# Patient Record
Sex: FEMALE | Race: WHITE | NOT HISPANIC OR LATINO | Employment: OTHER | URBAN - METROPOLITAN AREA
[De-identification: names, ages, dates, MRNs, and addresses within clinical notes are randomized per-mention and may not be internally consistent; named-entity substitution may affect disease eponyms.]

---

## 2017-02-27 ENCOUNTER — OFFICE VISIT - HEALTHEAST (OUTPATIENT)
Dept: GERIATRICS | Facility: CLINIC | Age: 72
End: 2017-02-27

## 2017-02-27 ENCOUNTER — AMBULATORY - HEALTHEAST (OUTPATIENT)
Dept: ADMINISTRATIVE | Facility: CLINIC | Age: 72
End: 2017-02-27

## 2017-02-27 DIAGNOSIS — M15.9 GENERALIZED OA: ICD-10-CM

## 2017-02-27 DIAGNOSIS — R29.898 LEG WEAKNESS: ICD-10-CM

## 2017-02-27 DIAGNOSIS — E03.9 HYPOTHYROIDISM: ICD-10-CM
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
## 2017-02-27 DIAGNOSIS — M81.0 OSTEOPOROSIS: ICD-10-CM

## 2017-02-27 DIAGNOSIS — I10 ESSENTIAL HYPERTENSION: ICD-10-CM

## 2017-02-27 RX ORDER — ROSUVASTATIN CALCIUM 40 MG/1
40 TABLET, COATED ORAL AT BEDTIME
Status: SHIPPED | COMMUNITY
Start: 2017-02-27

## 2017-02-27 RX ORDER — LEVOTHYROXINE SODIUM 112 UG/1
112 TABLET ORAL DAILY
Status: SHIPPED | COMMUNITY
Start: 2017-02-27

## 2017-02-27 RX ORDER — LOSARTAN POTASSIUM 100 MG/1
50 TABLET ORAL DAILY
Status: SHIPPED | COMMUNITY
Start: 2017-02-27

## 2017-02-27 RX ORDER — CALCIUM CARBONATE 500(1250)
1 TABLET ORAL DAILY
Status: SHIPPED | COMMUNITY
Start: 2017-02-27

## 2017-02-27 RX ORDER — TRAMADOL HYDROCHLORIDE 50 MG/1
50 TABLET ORAL EVERY 6 HOURS PRN
Status: SHIPPED | COMMUNITY
Start: 2017-02-27

## 2017-03-02 ENCOUNTER — OFFICE VISIT - HEALTHEAST (OUTPATIENT)
Dept: GERIATRICS | Facility: CLINIC | Age: 72
End: 2017-03-02

## 2017-03-02 DIAGNOSIS — E03.9 HYPOTHYROIDISM: ICD-10-CM

## 2017-03-02 DIAGNOSIS — T07.XXXA MULTIPLE WOUNDS: ICD-10-CM

## 2017-03-02 DIAGNOSIS — F32.A DEPRESSION: ICD-10-CM

## 2017-03-02 DIAGNOSIS — I10 ESSENTIAL HYPERTENSION: ICD-10-CM

## 2017-03-02 DIAGNOSIS — R53.1 WEAKNESS: ICD-10-CM

## 2017-03-06 ENCOUNTER — OFFICE VISIT - HEALTHEAST (OUTPATIENT)
Dept: GERIATRICS | Facility: CLINIC | Age: 72
End: 2017-03-06

## 2017-03-06 DIAGNOSIS — M15.9 GENERALIZED OA: ICD-10-CM

## 2017-03-06 DIAGNOSIS — R52 PAIN MANAGEMENT: ICD-10-CM

## 2017-03-06 DIAGNOSIS — R29.898 LEG WEAKNESS: ICD-10-CM

## 2017-03-06 DIAGNOSIS — I10 ESSENTIAL HYPERTENSION: ICD-10-CM

## 2017-03-09 ENCOUNTER — OFFICE VISIT - HEALTHEAST (OUTPATIENT)
Dept: GERIATRICS | Facility: CLINIC | Age: 72
End: 2017-03-09

## 2017-03-09 DIAGNOSIS — R26.81 GAIT INSTABILITY: ICD-10-CM

## 2017-03-09 DIAGNOSIS — I10 ESSENTIAL HYPERTENSION: ICD-10-CM

## 2017-03-09 DIAGNOSIS — R29.898 LEG WEAKNESS: ICD-10-CM
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
## 2017-03-13 ENCOUNTER — AMBULATORY - HEALTHEAST (OUTPATIENT)
Dept: GERIATRICS | Facility: CLINIC | Age: 72
End: 2017-03-13

## 2021-05-25 ENCOUNTER — RECORDS - HEALTHEAST (OUTPATIENT)
Dept: ADMINISTRATIVE | Facility: CLINIC | Age: 76
End: 2021-05-25

## 2021-06-09 NOTE — PROGRESS NOTES
[facility]    Facility:   [facility] [[id number]]   Code Status: FULL CODE      CHIEF COMPLAINT/REASON FOR VISIT:  Chief Complaint   Patient presents with     Follow Up     lucina, rehab       HISTORY:      HPI: Vanesa is a 71 y.o. female Who I had the pleasure of visiting with once again today secondary to her hospitalization February 21 secondary to a fall and was found down hypothermic and unresponsive. Currently is on the transitional care unit and she's made pretty good progress. Denies muscle aches or pains a lease from the rhabdomyolysis. She does have chronic pain osteoarthritis she does take tramadol as needed taking about three doses per day. She has been normotensive and afebrile and also on room air. Sometimes use a walker and sometimes not. Apparently having a care conference soon she believes she's ready to go home. Appetite good no particular other complaints.    Past Medical History:   Diagnosis Date     Acute respiratory failure      Diverticulitis      HTN (hypertension)      Hypercholesteremia      Hypothermia 02/2017     Hypothyroidism      Microscopic colitis      Osteoarthritis      Osteoporosis      Traumatic rhabdomyolysis      Tubular adenoma      Urge incontinence      Vitamin D deficiency              Family History   Problem Relation Age of Onset     Cancer Mother      Stroke Father      Leukemia Sister      Coronary artery disease Brother      Social History     Social History     Marital status:      Spouse name: N/A     Number of children: N/A     Years of education: N/A     Social History Main Topics     Smoking status: Never Smoker     Smokeless tobacco: Not on file     Alcohol use No     Drug use: Not on file     Sexual activity: Not on file     Other Topics Concern     Not on file     Social History Narrative     No narrative on file         Review of Systems  Currently she denies any chills or fever coughing wheezing chest  pain dizziness or vertigo nausea vomiting diarrhea dysuria rashes's or stiff neck swollen glands are headaches. History of hypertension. Hypothyroidism      Current Outpatient Prescriptions:      aspirin 81 MG EC tablet, Take 81 mg by mouth daily., Disp: , Rfl:      calcium, as carbonate, (OS-CAROL) 500 mg calcium (1,250 mg) tablet, Take 1 tablet by mouth daily., Disp: , Rfl:      levothyroxine (SYNTHROID, LEVOTHROID) 112 MCG tablet, Take 112 mcg by mouth daily., Disp: , Rfl:      losartan (COZAAR) 100 MG tablet, Take 50 mg by mouth daily., Disp: , Rfl:      rosuvastatin (CRESTOR) 40 MG tablet, Take 40 mg by mouth bedtime., Disp: , Rfl:      sertraline (ZOLOFT) 50 MG tablet, Take 758 mg by mouth daily., Disp: , Rfl:      traMADol (ULTRAM) 50 mg tablet, Take 50 mg by mouth every 6 (six) hours as needed for pain., Disp: , Rfl:     .  Vitals:    03/06/17 0956   BP: 138/48   Pulse: 85   Resp: 16   Temp: 98.3  F (36.8  C)   SpO2: 90%       Physical Exam   Constitutional: She is oriented to person, place, and time. She appears well-developed and well-nourished. No distress.   HENT:   Head: Normocephalic.   Eyes: Pupils are equal, round, and reactive to light.   Neck: Neck supple. No thyromegaly present.   Cardiovascular: Normal rate, regular rhythm and normal heart sounds.   Pulmonary/Chest: Breath sounds normal.   Abdominal: Bowel sounds are normal. There is no tenderness. There is no guarding.   Musculoskeletal:   Able to use the two wheeled walker. Denies pain. Does have tramadol PRN.   Lymphadenopathy:   She has no cervical adenopathy.   Neurological: She is oriented to person, place, and time.   Skin: Skin is warm and dry. No rash noted.   Abrasions to her elbows and knees. Dry skin to her back.   Psychiatric: She has a normal mood and affect. Her behavior is normal.    LABS:   Results for orders placed or performed in visit on 03/01/17   Basic Metabolic Panel   Result Value Ref Range    Sodium 140 136 - 145 mmol/L     Potassium 3.9 3.5 - 5.0 mmol/L    Chloride 107 98 - 107 mmol/L    CO2 26 22 - 31 mmol/L    Anion Gap, Calculation 7 5 - 18 mmol/L    Glucose 86 70 - 125 mg/dL    Calcium 8.9 8.5 - 10.5 mg/dL    BUN 6 (L) 8 - 28 mg/dL    Creatinine 0.53 (L) 0.60 - 1.10 mg/dL    GFR MDRD Af Amer >60 >60 mL/min/1.73m2    GFR MDRD Non Af Amer >60 >60 mL/min/1.73m2       Lab Results   Component Value Date    WBC 5.9 03/01/2017    HGB 11.1 (L) 03/01/2017    HCT 35.4 03/01/2017    MCV 99 03/01/2017     03/01/2017         ASSESSMENT:      ICD-10-CM    1. Essential hypertension I10    2. Leg weakness M62.81    3. Generalized OA M15.9    4. Pain management R52        PLAN:    At this time no further changes are necessary. Did have a number of laboratory studies March 1. She wants to go home soon. Apparently is on the list soon for a care conference and she's hoping be discharged by the end of the week    Electronically signed by: Michael Duane Johnson, CNP

## 2021-06-09 NOTE — PROGRESS NOTES
[facility]      Facility:    [facility] [[id number]]  Code Status: FULL CODE       Chief Complaint/Reason for Visit:  Chief Complaint   Patient presents with     H & P     New admit to TCU: multiple concerns. (H & P 3/2/17).       HPI:   Vanesa is a 71 y.o. female who was recently hospitalized at [facility] for hypothermia, rhabdomyolysis, frostbite, sepsis. She lives near [place], [place] and was found down on her porch on 2/21/17 with initial reports stating she had last been seen 36-48 hours prior. Patient reports she was on her porch to let her dog in, fell and must have hit her head and blacked out. She recalls however trying to crawl to get inside. Her daughter found her, called EMS. She was air lifted to [facility] where she was noted to be hypothermic down to 78 degrees and lethargic. In the ED she was intubated for airway protection. Tox screen was negative. Head CT showed no acute intracranial injury. Cervical spine CT showed no fracture. She was treated with warming, IVF's, pressor support and broad spectrum antibiotics. She was able to be extubated the next day. Cultures revealed E coli UTI. Other notable issues were presenting Hgb of 8.8. Also had high Na and low K on admit. Mild elevated liver enzymes with hx microscopic colitis. Significant wounds on elbows, knees, feet, and rhabdomyolysis. Medical managed and stabilized in the hospital, deemed appropriate for discharge to TCU on 2/25/17 for PT, OT, nursing cares, medical management and monitoring.     She is feeling weak but improved. Minimal pain in extremities and at wounds. She was told she likely has concussion. Working with speech therapy for cognitive exercises. She denies headache, nausea, vomiting, chest pain, shortness of breath or dizziness. Appetite is fair. No diarrhea or constipation. No abdominal pain. No new vision or hearing problems.       Past Medical History:  Past Medical  History:   Diagnosis Date     Acute respiratory failure      Diverticulitis      HTN (hypertension)      Hypercholesteremia      Hypothermia 02/2017     Hypothyroidism      Microscopic colitis      Osteoarthritis      Osteoporosis      Traumatic rhabdomyolysis      Tubular adenoma      Urge incontinence      Vitamin D deficiency            Surgical History:  Past Surgical History:   Procedure Laterality Date     APPENDECTOMY       CHOLECYSTECTOMY       HEMICOLECTOMY  2005    for diverticular mass     TOTAL KNEE ARTHROPLASTY         Family History:   Family History   Problem Relation Age of Onset     Cancer Mother      Stroke Father      Leukemia Sister      Coronary artery disease Brother        Social History:    Social History     Social History     Marital status:      Spouse name: N/A     Number of children: N/A     Years of education: N/A     Social History Main Topics     Smoking status: Never Smoker     Smokeless tobacco: Not on file     Alcohol use No     Drug use: Not on file     Sexual activity: Not on file     Other Topics Concern     Not on file     Social History Narrative     No narrative on file        Medications:  Current Outpatient Prescriptions   Medication Sig     aspirin 81 MG EC tablet Take 81 mg by mouth daily.     calcium, as carbonate, (OS-CAROL) 500 mg calcium (1,250 mg) tablet Take 1 tablet by mouth daily.     levothyroxine (SYNTHROID, LEVOTHROID) 112 MCG tablet Take 112 mcg by mouth daily.     losartan (COZAAR) 100 MG tablet Take 50 mg by mouth daily.     rosuvastatin (CRESTOR) 40 MG tablet Take 40 mg by mouth bedtime.     sertraline (ZOLOFT) 50 MG tablet Take 758 mg by mouth daily.     traMADol (ULTRAM) 50 mg tablet Take 50 mg by mouth every 6 (six) hours as needed for pain.       Allergies:  Allergies   Allergen Reactions     Adhesive Tape-Silicones      Atorvastatin      Lisinopril      Oxycodone      Penicillins        Review of Systems:  Pertinent items are noted in  HPI.      Physical Exam:   General: Patient is alert, pleasant female, no distress.   Vitals: /68, Temp 98.4, Pulse 86, RR 16, O2 sat 93% RA.  HEENT: Head is NCAT. Eyes show no injection or icterus. Nares negative. Oropharynx well hydrated.  Neck: Supple. No tenderness or adenopathy. No JVD.  Lungs: Clear bilaterally. No wheezes.  Cardiovascular: Regular rate and rhythm, normal S1, S2.  Back: No spinal or CVA tenderness.  Abdomen: Soft, no tenderness on exam. Bowel sounds present. No guarding rebound or rigidity.  : Deferred.  Extremities: Mild edema is noted.  Musculoskeletal: Age related degen changes.  Skin: Significant wounds and abrasions bilateral knees, bilateral elbows and bilateral feet/toes.   Psych: Mood appears good.      Labs:  Results for orders placed or performed in visit on 03/01/17   Basic Metabolic Panel   Result Value Ref Range    Sodium 140 136 - 145 mmol/L    Potassium 3.9 3.5 - 5.0 mmol/L    Chloride 107 98 - 107 mmol/L    CO2 26 22 - 31 mmol/L    Anion Gap, Calculation 7 5 - 18 mmol/L    Glucose 86 70 - 125 mg/dL    Calcium 8.9 8.5 - 10.5 mg/dL    BUN 6 (L) 8 - 28 mg/dL    Creatinine 0.53 (L) 0.60 - 1.10 mg/dL    GFR MDRD Af Amer >60 >60 mL/min/1.73m2    GFR MDRD Non Af Amer >60 >60 mL/min/1.73m2       Lab Results   Component Value Date    WBC 5.9 03/01/2017    HGB 11.1 (L) 03/01/2017    HCT 35.4 03/01/2017    MCV 99 03/01/2017     03/01/2017       Assessment/Plan:  1. Multiple wounds. Bilateral elbows, knees, feet. Frostbite and abrasions. Cont current cares.  2. Weakness. Significant medical illness and debilitation. Here for therapy for strengthening.  3. HTN. On Losartan.  4. Hypothyroidism. Cont home Levothyroxine.  5. Depression. Cont home Sertraline.  6. HLD. On Rosuvastatin.  7. Other hospital issues: hypothermia after being found down, rhabdo. UTI, elevated LFTs, sepsis, UTI (active hospital issues improved or resolved by the time of discharge).  8. Anemia.  9. Code  status is full code.      Total time greater than 70 minutes, greater than 50% counseling and coordination of care, time spent in interview and examination of patient, review of records, discussion with nursing staff.      Electronically signed by: Jing Larkin MD

## 2021-06-09 NOTE — PROGRESS NOTES
[facility]    Facility:   [facility] [[id number]]   Code Status: FULL CODE      CHIEF COMPLAINT/REASON FOR VISIT:  Chief Complaint   Patient presents with     Problem Visit     asked to see       HISTORY:      HPI: Vanesa is a 71 y.o. female Who I was asked to see secondary to recent hospitalization February 21, 2017 from an incident that occurred in [facility] who was flown from [facility] after being found down. She was found by her daughter laying on the porch and was found to be hypothermic temperature at 78  lethargic and unresponsive. When she reached the emergency department she was intubated for airway protection and stabilization. Her laboratory studies did show a CK 2588 white cells 10.7 hemoglobin 8.8 platelets 125 phosphors 3.4 ALT 23 AST 61 albumin 1.5 sodium 154 potassium 2.4 creatinine 0.23 BUN 17. EKG did show a bradycardia. CT of the head did not show any traumatic intracranial injury. CT of the cervical spine did not reveal any fractures. CT of the chest did show intact thoracic aorta. Dependent opacities in both lungs clonic diverticulosis. She also did have multiple wounds and injuries as well as rhabdomyolysis. They did check her thyroid blood sugar. They did use for leaders of saline in the emergency room and did check her electrolytes secondary to hypernatremia and hypokalemia.  Currently she is on the transitional care unit and actually pretty healthy at this time most family and friends are thinking that she shouldn't even be here and be able to go home but we had a chance to address that in the importance of retaining and obtaining her strength and stamina and get her balance and core strength back. She agrees with all those accounts. Also be good idea to repeater labs from the hospital just to make sure that everything is in good shape and there are no other particular issues. Otherwise her appetite is good she has been  normotensive afebrile also on room air. No particular pain she's taken to tramadol is PRN. She is finishing up her Cipro from not sure what.    Past Medical History:   Diagnosis Date     Acute respiratory failure      Diverticulitis      HTN (hypertension)      Hypercholesteremia      Hypothermia 02/2017     Hypothyroidism      Microscopic colitis      Osteoarthritis      Osteoporosis      Traumatic rhabdomyolysis      Tubular adenoma      Urge incontinence      Vitamin D deficiency              Family History   Problem Relation Age of Onset     Cancer Mother      Stroke Father      Leukemia Sister      Coronary artery disease Brother      Social History     Social History     Marital status:      Spouse name: N/A     Number of children: N/A     Years of education: N/A     Social History Main Topics     Smoking status: Never Smoker     Smokeless tobacco: Not on file     Alcohol use No     Drug use: Not on file     Sexual activity: Not on file     Other Topics Concern     Not on file     Social History Narrative     No narrative on file         Review of Systems  Currently she denies any chills or fever coughing wheezing chest pain dizziness or vertigo nausea vomiting diarrhea dysuria rashes's or stiff neck swollen glands are headaches. History of hypertension. Hypothyroidism    Current Outpatient Prescriptions:      aspirin 81 MG EC tablet, Take 81 mg by mouth daily., Disp: , Rfl:      calcium, as carbonate, (OS-CAROL) 500 mg calcium (1,250 mg) tablet, Take 1 tablet by mouth daily., Disp: , Rfl:      ciprofloxacin HCl (CIPRO) 500 MG tablet, Take 500 mg by mouth 2 (two) times a day., Disp: , Rfl:      levothyroxine (SYNTHROID, LEVOTHROID) 112 MCG tablet, Take 112 mcg by mouth daily., Disp: , Rfl:      losartan (COZAAR) 100 MG tablet, Take 50 mg by mouth daily., Disp: , Rfl:      rosuvastatin (CRESTOR) 40 MG tablet, Take 40 mg by mouth bedtime., Disp: , Rfl:      sertraline (ZOLOFT) 50 MG tablet, Take 758 mg by  mouth daily., Disp: , Rfl:      traMADol (ULTRAM) 50 mg tablet, Take 50 mg by mouth every 6 (six) hours as needed for pain., Disp: , Rfl:     .  Vitals:    02/27/17 1348   BP: 147/59   Pulse: 87   Resp: 17   Temp: 98.1  F (36.7  C)   SpO2: 98%       Physical Exam   Constitutional: She is oriented to person, place, and time. She appears well-developed and well-nourished. No distress.   HENT:   Head: Normocephalic.   Eyes: Pupils are equal, round, and reactive to light.   Neck: Neck supple. No thyromegaly present.   Cardiovascular: Normal rate, regular rhythm and normal heart sounds.    Pulmonary/Chest: Breath sounds normal.   Abdominal: Bowel sounds are normal. There is no tenderness. There is no guarding.   Musculoskeletal:   Able to use the two wheeled walker. Denies pain. Does have tramadol PRN.   Lymphadenopathy:     She has no cervical adenopathy.   Neurological: She is oriented to person, place, and time.   Skin: Skin is warm and dry. No rash noted.   Abrasions to her elbows and knees. Dry skin to her back.   Psychiatric: She has a normal mood and affect. Her behavior is normal.         LABS:   See above  Results for orders placed or performed during the hospital encounter of 12/01/10   Basic Metabolic Panel   Result Value Ref Range    Sodium 137 136 - 145 mmol/L    Potassium 4.4 3.5 - 5.0 mmol/L    CO2 25 25 - 31 mmol/L    Chloride 107 98 - 107 mmol/L    Anion Gap, Calculation 5 5 - 18 mmol/L    BUN 5 (L) 8 - 22 mg/dL    Creatinine 0.48 (L) 0.60 - 1.10 mg/dL    GFR MDRD Non Af Amer >60 >60 ml/min/1.73m2    GFR MDRD Af Amer >60 >60 ml/min/1.73m2    Glucose 115 70 - 125 mg/dL    Calcium 7.9 (L) 8.5 - 10.5 mg/dL           ASSESSMENT:      ICD-10-CM    1. Essential hypertension I10    2. Fall W19.XXXA    3. Leg weakness M62.81    4. Generalized OA M15.9    5. Osteoporosis M81.0    6. Hypothyroidism E03.9        PLAN:    Will give her some Aquaphor to her rash on her back seem to be more dry skin. Will put  bacitracin on her scabs in various wounds. We will repeat some of her labs is to make sure that sodium potassium hemoglobin her own pretty good shape in the next couple of days otherwise she will work with therapy and try to improve her strength balance and conditioning.          For documentation purposes total visit was over 40 minutes to which over 50% was spent with the patient going over her care her medications her hospitalization her stay on the transitional care unit and coordination of care efforts.        Electronically signed by: Michael Duane Johnson, CNP

## 2021-06-09 NOTE — PROGRESS NOTES
[facility]    Facility:   [facility] [[id number]]   Code Status: FULL CODE  PCP: Provider Not in System   Phone: None   Fax: [phone]      CHIEF COMPLAINT/REASON FOR VISIT:  Chief Complaint   Patient presents with     Discharge Summary       HISTORY COURSE:  Vanesa is a 71 y.o. female Who I was asked to see secondary to recent hospitalization February 21, 2017 from an incident that occurred in [facility] who was flown from [facility] after being found down. She was found by her daughter laying on the porch and was found to be hypothermic temperature at 78  lethargic and unresponsive. When she reached the emergency department she was intubated for airway protection and stabilization. Her laboratory studies did show a CK 2588 white cells 10.7 hemoglobin 8.8 platelets 125 phosphors 3.4 ALT 23 AST 61 albumin 1.5 sodium 154 potassium 2.4 creatinine 0.23 BUN 17. EKG did show a bradycardia. CT of the head did not show any traumatic intracranial injury. CT of the cervical spine did not reveal any fractures. CT of the chest did show intact thoracic aorta. Dependent opacities in both lungs clonic diverticulosis. She also did have multiple wounds and injuries as well as rhabdomyolysis. They did check her thyroid blood sugar. They did use for leaders of saline in the emergency room and did check her electrolytes secondary to hypernatremia and hypokalemia.  She has done remarkably well while being on the transitional care unit at [facility].  No unusual aches or pains although does have generalized osteoarthritis and does take tramadol is as needed usually anywhere from 1-3 doses per day.  She has been normotensive and afebrile.  Has been independent using her walker.  No respiratory issues.  He is on Tamiflu prophylaxis until March 16 secondary to a confirmed case on the unit but has otherwise been asymptomatic.  She is excited about going  home and does feel that she will do fine without any particular problems or other issues.  Review of Systems  Currently she denies any chills or fever coughing wheezing chest pain dizziness or vertigo nausea vomiting diarrhea dysuria rashes's or stiff neck swollen glands are headaches. History of hypertension. Hypothyroidism  Vitals:    03/09/17 1252   BP: (!) 154/93   Pulse: 78   Resp: 18   Temp: 99  F (37.2  C)   SpO2: 97%       Physical Exam  Constitutional: She is oriented to person, place, and time. She appears well-developed and well-nourished. No distress.   HENT:   Head: Normocephalic.   Eyes: Pupils are equal, round, and reactive to light.   Neck: Neck supple. No thyromegaly present.   Cardiovascular: Normal rate, regular rhythm and normal heart sounds.   Pulmonary/Chest: Breath sounds normal.   Abdominal: Bowel sounds are normal. There is no tenderness. There is no guarding.   Musculoskeletal:   Able to use the two wheeled walker. Denies pain. Does have tramadol PRN.   Lymphadenopathy:   She has no cervical adenopathy.   Neurological: She is oriented to person, place, and time.   Skin: Skin is warm and dry. No rash noted.   Abrasions to her elbows and knees. Dry skin to her back.   Psychiatric: She has a normal mood and affect. Her behavior is normal.     Results for orders placed or performed in visit on 03/01/17   Basic Metabolic Panel   Result Value Ref Range    Sodium 140 136 - 145 mmol/L    Potassium 3.9 3.5 - 5.0 mmol/L    Chloride 107 98 - 107 mmol/L    CO2 26 22 - 31 mmol/L    Anion Gap, Calculation 7 5 - 18 mmol/L    Glucose 86 70 - 125 mg/dL    Calcium 8.9 8.5 - 10.5 mg/dL    BUN 6 (L) 8 - 28 mg/dL    Creatinine 0.53 (L) 0.60 - 1.10 mg/dL    GFR MDRD Af Amer >60 >60 mL/min/1.73m2    GFR MDRD Non Af Amer >60 >60 mL/min/1.73m2       Lab Results   Component Value Date    WBC 5.9 03/01/2017    HGB 11.1 (L) 03/01/2017    HCT 35.4 03/01/2017    MCV 99 03/01/2017     03/01/2017       MEDICATION  LIST:  Current Outpatient Prescriptions   Medication Sig     aspirin 81 MG EC tablet Take 81 mg by mouth daily.     calcium, as carbonate, (OS-CAROL) 500 mg calcium (1,250 mg) tablet Take 1 tablet by mouth daily.     levothyroxine (SYNTHROID, LEVOTHROID) 112 MCG tablet Take 112 mcg by mouth daily.     losartan (COZAAR) 100 MG tablet Take 50 mg by mouth daily.     rosuvastatin (CRESTOR) 40 MG tablet Take 40 mg by mouth bedtime.     sertraline (ZOLOFT) 50 MG tablet Take 75 mg by mouth daily.      traMADol (ULTRAM) 50 mg tablet Take 50 mg by mouth every 6 (six) hours as needed for pain.       DISCHARGE DIAGNOSIS:    ICD-10-CM    1. Essential hypertension I10    2. Gait instability R26.81    3. Leg weakness M62.81    4. Fall W19.XXXA        MEDICAL EQUIPMENT NEEDS:  walker    DISCHARGE PLAN/FACE TO FACE:  I certify that this patient is under my care and that I, or a nurse practitioner or physician's assistant working with me, had a face-to-face encounter that meets the physician face-to-face encounter requirements with this patient.   Date of Face-to-Face Encounter: March 9, 2017    I certify that, based on my findings, the following services are medically necessary home health services: Discharging to home with current medications and narcotics.  No services     My clinical findings support the need for the above skilled services because: (Please write a brief narrative summary that describes what the RN, PT, SLP, or other services will be doing in the home. A list of diagnoses in this section does not meet the CMS requirements.)  No skilled services required.      This patient is homebound because: (Please write a brief narrative summary describing the functional limitations as to why this patient is homebound and specifically what makes this patient homebound.)  Will not be homebound.    The patient is, or has been, under my care and I have initiated the establishment of the plan of care. This patient will be followed  by a physician who will periodically review the plan of care.  She will follow-up with her primary care doctor regarding medication management and any future laboratory studies.  She does feel really good at this time does not have any particular questions.  I did again go over her hospitalization and the reason for hospitalization and she does understand and does feel comfortable upon going home and does have family support.    Discharge coordination of care greater than 30 minutes.    Electronically signed by: Michael Duane Johnson, CNP       Electronically signed by: Jing Larkin MD

## 2021-06-15 PROBLEM — E03.9 HYPOTHYROIDISM: Status: ACTIVE | Noted: 2017-02-27

## 2021-06-15 PROBLEM — M15.9 GENERALIZED OA: Status: ACTIVE | Noted: 2017-02-27

## 2021-06-15 PROBLEM — R29.898 LEG WEAKNESS: Status: ACTIVE | Noted: 2017-02-27
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
## 2021-06-15 PROBLEM — I10 HTN (HYPERTENSION): Status: ACTIVE | Noted: 2017-02-27

## 2021-06-15 PROBLEM — M81.0 OSTEOPOROSIS: Status: ACTIVE | Noted: 2017-02-27

## 2023-08-22 ENCOUNTER — HOSPITAL ENCOUNTER (EMERGENCY)
Facility: HOSPITAL | Age: 78
Discharge: HOME | End: 2023-08-22
Attending: EMERGENCY MEDICINE | Admitting: EMERGENCY MEDICINE
Payer: COMMERCIAL

## 2023-08-22 ENCOUNTER — APPOINTMENT (OUTPATIENT)
Dept: CT IMAGING | Facility: HOSPITAL | Age: 78
End: 2023-08-22
Attending: EMERGENCY MEDICINE
Payer: COMMERCIAL

## 2023-08-22 VITALS
DIASTOLIC BLOOD PRESSURE: 67 MMHG | SYSTOLIC BLOOD PRESSURE: 135 MMHG | WEIGHT: 191 LBS | OXYGEN SATURATION: 95 % | HEART RATE: 75 BPM | BODY MASS INDEX: 35.15 KG/M2 | TEMPERATURE: 98.1 F | HEIGHT: 62 IN | RESPIRATION RATE: 20 BRPM

## 2023-08-22 DIAGNOSIS — I10 ACCELERATED HYPERTENSION: ICD-10-CM

## 2023-08-22 LAB
ALBUMIN UR-MCNC: NEGATIVE MG/DL
ANION GAP SERPL CALCULATED.3IONS-SCNC: 11 MMOL/L (ref 7–15)
APPEARANCE UR: CLEAR
BILIRUB UR QL STRIP: NEGATIVE
BUN SERPL-MCNC: 11.3 MG/DL (ref 8–23)
CALCIUM SERPL-MCNC: 9.5 MG/DL (ref 8.8–10.2)
CHLORIDE SERPL-SCNC: 100 MMOL/L (ref 98–107)
COLOR UR AUTO: COLORLESS
CREAT SERPL-MCNC: 0.49 MG/DL (ref 0.51–0.95)
DEPRECATED HCO3 PLAS-SCNC: 25 MMOL/L (ref 22–29)
ERYTHROCYTE [DISTWIDTH] IN BLOOD BY AUTOMATED COUNT: 13 % (ref 10–15)
GFR SERPL CREATININE-BSD FRML MDRD: >90 ML/MIN/1.73M2
GLUCOSE SERPL-MCNC: 101 MG/DL (ref 70–99)
GLUCOSE UR STRIP-MCNC: NEGATIVE MG/DL
HCT VFR BLD AUTO: 40.5 % (ref 35–47)
HGB BLD-MCNC: 13.6 G/DL (ref 11.7–15.7)
HGB UR QL STRIP: NEGATIVE
KETONES UR STRIP-MCNC: NEGATIVE MG/DL
LEUKOCYTE ESTERASE UR QL STRIP: NEGATIVE
MAGNESIUM SERPL-MCNC: 1.8 MG/DL (ref 1.7–2.3)
MCH RBC QN AUTO: 30.6 PG (ref 26.5–33)
MCHC RBC AUTO-ENTMCNC: 33.6 G/DL (ref 31.5–36.5)
MCV RBC AUTO: 91 FL (ref 78–100)
NITRATE UR QL: NEGATIVE
PH UR STRIP: 5.5 [PH] (ref 5–7)
PLATELET # BLD AUTO: 259 10E3/UL (ref 150–450)
POTASSIUM SERPL-SCNC: 4 MMOL/L (ref 3.4–5.3)
RBC # BLD AUTO: 4.44 10E6/UL (ref 3.8–5.2)
RBC URINE: <1 /HPF
SODIUM SERPL-SCNC: 136 MMOL/L (ref 136–145)
SP GR UR STRIP: 1.01 (ref 1–1.03)
SQUAMOUS EPITHELIAL: 3 /HPF
TSH SERPL DL<=0.005 MIU/L-ACNC: 2.55 UIU/ML (ref 0.3–4.2)
UROBILINOGEN UR STRIP-MCNC: <2 MG/DL
WBC # BLD AUTO: 6.6 10E3/UL (ref 4–11)
WBC URINE: 1 /HPF

## 2023-08-22 PROCEDURE — 80048 BASIC METABOLIC PNL TOTAL CA: CPT | Performed by: EMERGENCY MEDICINE

## 2023-08-22 PROCEDURE — 81001 URINALYSIS AUTO W/SCOPE: CPT | Performed by: EMERGENCY MEDICINE

## 2023-08-22 PROCEDURE — 99285 EMERGENCY DEPT VISIT HI MDM: CPT | Mod: 25

## 2023-08-22 PROCEDURE — 83735 ASSAY OF MAGNESIUM: CPT | Performed by: EMERGENCY MEDICINE

## 2023-08-22 PROCEDURE — 70450 CT HEAD/BRAIN W/O DYE: CPT

## 2023-08-22 PROCEDURE — 84443 ASSAY THYROID STIM HORMONE: CPT | Performed by: EMERGENCY MEDICINE

## 2023-08-22 PROCEDURE — 85027 COMPLETE CBC AUTOMATED: CPT | Performed by: EMERGENCY MEDICINE

## 2023-08-22 PROCEDURE — 93005 ELECTROCARDIOGRAM TRACING: CPT | Performed by: STUDENT IN AN ORGANIZED HEALTH CARE EDUCATION/TRAINING PROGRAM

## 2023-08-22 PROCEDURE — 36415 COLL VENOUS BLD VENIPUNCTURE: CPT | Performed by: EMERGENCY MEDICINE

## 2023-08-22 PROCEDURE — 93005 ELECTROCARDIOGRAM TRACING: CPT | Performed by: EMERGENCY MEDICINE

## 2023-08-22 RX ORDER — HYDRALAZINE HYDROCHLORIDE 10 MG/1
10 TABLET, FILM COATED ORAL DAILY
Qty: 7 TABLET | Refills: 0 | Status: SHIPPED | OUTPATIENT
Start: 2023-08-22 | End: 2023-08-29

## 2023-08-22 RX ORDER — AMLODIPINE BESYLATE 2.5 MG/1
2.5 TABLET ORAL DAILY
Qty: 7 TABLET | Refills: 0 | Status: SHIPPED | OUTPATIENT
Start: 2023-08-22 | End: 2023-08-22 | Stop reason: SINTOL

## 2023-08-22 ASSESSMENT — ACTIVITIES OF DAILY LIVING (ADL): ADLS_ACUITY_SCORE: 35

## 2023-08-22 NOTE — ED TRIAGE NOTES
Pt arrived via triage. Pt reports having dental visits last couple weeks with bp elevation. Went to md and had high bp too. Pt reports random checks last week finding greater then 180 systolic. Went to fire station and found to have htn. Pt reports headache on top of head. Took meds as scheduled.     Triage Assessment       Row Name 08/22/23 2638       Triage Assessment (Adult)    Airway WDL WDL       Respiratory WDL    Respiratory WDL WDL       Skin Circulation/Temperature WDL    Skin Circulation/Temperature WDL WDL       Cardiac WDL    Cardiac WDL WDL       Peripheral/Neurovascular WDL    Peripheral Neurovascular WDL WDL       Cognitive/Neuro/Behavioral WDL    Cognitive/Neuro/Behavioral WDL WDL

## 2023-08-22 NOTE — ED PROVIDER NOTES
Emergency Department Encounter     Evaluation Date & Time:   2023 12:33 PM    CHIEF COMPLAINT:  Hypertension      Triage Note:Pt arrived via triage. Pt reports having dental visits last couple weeks with bp elevation. Went to md and had high bp too. Pt reports random checks last week finding greater then 180 systolic. Went to fire station and found to have htn. Pt reports headache on top of head. Took meds as scheduled.     Triage Assessment       Row Name 23 1204       Triage Assessment (Adult)    Airway WDL WDL       Respiratory WDL    Respiratory WDL WDL       Skin Circulation/Temperature WDL    Skin Circulation/Temperature WDL WDL       Cardiac WDL    Cardiac WDL WDL       Peripheral/Neurovascular WDL    Peripheral Neurovascular WDL WDL       Cognitive/Neuro/Behavioral WDL    Cognitive/Neuro/Behavioral WDL WDL                          Impression and Plan       FINAL IMPRESSION:    ICD-10-CM    1. Accelerated hypertension  I10             ED COURSE & MEDICAL DECISION MAKIN:40 PM Met with patient for initial interview and exam. Patient's daughter is at bedside.  2:32 PM Discussed lab and imaging results with patient.   2:33 PM Spoke to pharmacy.  2:43 PM Rechecked patient. Went over plans for discharge, she and her daughter are agreeable.    77 year old female, history of HTN, HLD, hypothyroidism and osteoporosis, who presents for evaluation of hypertension with blood pressures elevated (170s-200s systolic) over the past couple of weeks despite being compliant with her antihypertensives (losartan and chlorthalidone).     She reports gradual onset of frontal / crown headache a couple of days ago with no associated focal weakness / paresthesias, vision changes, dysphagia, vertigo, difficulties with speech or ambulation. No nausea / vomiting. She also denies chest pain and shortness of breath.    On exam, she has RRR with clear lungs. Neuro exam is normal.    Patient placed on cardiac monitor, IV  access established and blood sent for labs.    EKG performed and demonstrated NSR with no ischemic changes.     Head CT performed given headache and hypertension and was negative for intracranial hemorrhage and mass.     Labs remarkable for no leukocytosis, anemia, electrolyte derangements or renal impairment.  TSH WNL.  UA negative for hematuria and infection.    Blood pressure on presentation elevated (170s/102), which improved to 135/67 without intervention. Pharmacist consulted and recommended adding amlodipine; daughter reports that patient was previously on amlodipine, however it caused BL lower extremity swelling and cough. Pharmacist agrees with plan to initiate hydralazine 10mg daily.    Patient discharged to home with close follow-up with her PCP this week for recheck. She was given a prescription for hydralazine 10mg daily. Return precautions provided. Patient stable throughout ED course.      Medical Decision Making    History:  Supplemental history from: Family Member/Significant Other SEE ABOVE and HPI  External Record(s) reviewed: Outpatient Record: 08/15/2023 Office Visit     Work Up:  Chart documentation includes differential considered and any EKGs or imaging independently interpreted by provider, where specified.  In additional to work up documented, I considered the following work up: Documented in chart, if applicable.    External consultation:  Discussion of management with another provider: Pharmacist    Complicating factors:  Care impacted by chronic illness: Hypertension and Other: Hypercholesterolemia, Hypothyroidism  Care affected by social determinants of health: N/A    Disposition considerations: Discharge. I prescribed additional prescription strength medication(s) as charted. I considered admission, but ultimately discharged patient given reassuring evaluation and shared decision making conversation.      At the conclusion of the encounter I discussed the results of all the tests and  the disposition. The questions were answered. The patient and family acknowledged understanding and were agreeable with the care plan.      MEDICATIONS GIVEN IN THE EMERGENCY DEPARTMENT:  Medications - No data to display    NEW PRESCRIPTIONS STARTED AT TODAY'S ED VISIT:  New Prescriptions    HYDRALAZINE (APRESOLINE) 10 MG TABLET    Take 1 tablet (10 mg) by mouth daily for 7 days       HPI     The history is provided by the patient and a relative. No  was used.        Vanesa Benson is a 77 year old female, history of HTN, HLD, hypothyroidism and osteoporosis, who presents to this ED by walk in for evaluation of hypertension.    Per chart review, the patient had a Office Visit on 08/15/2023 at [facility]. BP was 155/85. The patient reported that her blood pressure is normal at home. Discussed possibly starting a calcium channel blocker as she is already on losartan 100 mg.    The patient is from [place] and is in the [facility] visiting her daughter. She was going to return home today because she was not feeling well and her daughter took her blood pressure finding it to be elevated (170s-200/120s). Patient reports gradual onset of frontal and crown headache a couple of days ago associated with generalized weakness. She denies focal weakness, paresthesias, vision changes, dysphagia, vertigo, difficulties with speech or ambulation. No nausea / vomiting. She also denies chest pain and shortness of breath.    Patient reports that her blood pressures have been elevated for the past couple of weeks. She had a dental procedure for implants and her blood pressure at that visit was 199 systolic. She followed with her PCP and her blood pressure at that visit was elevated, but attributed to stress. She has checked her blood pressure periodically at home and at the fire department and it has been elevated then as well (180s systolic). She has been taking her losartan 100mg  "daily as well as chlorthalidone.     She has otherwise been in her usual state of health and denies abdominal pain, diarrhea, cough, fever or other concerns.  She is not anticoagulated.      REVIEW OF SYSTEMS:  All other systems reviewed and are negative.      Medical History     No past medical history on file.    Past Surgical History:   Procedure Laterality Date    APPENDECTOMY      CHOLECYSTECTOMY      COLECTOMY SUBTOTAL  2005    for diverticular mass    TOTAL KNEE ARTHROPLASTY         Family History   Problem Relation Age of Onset    Cancer Mother     Cerebrovascular Disease Father     Leukemia Sister     Coronary Artery Disease Brother        Social History     Tobacco Use    Smoking status: Never   Substance Use Topics    Alcohol use: No       hydrALAZINE (APRESOLINE) 10 MG tablet  aspirin 81 MG EC tablet  calcium, as carbonate, (OS-CAROL) 500 mg calcium (1,250 mg) tablet  levothyroxine (SYNTHROID, LEVOTHROID) 112 MCG tablet  losartan (COZAAR) 100 MG tablet  rosuvastatin (CRESTOR) 40 MG tablet  sertraline (ZOLOFT) 50 MG tablet  traMADol (ULTRAM) 50 mg tablet        Physical Exam     First Vitals:  Patient Vitals for the past 24 hrs:   BP Temp Temp src Pulse Resp SpO2 Height Weight   08/22/23 1400 135/67 -- -- 75 -- 95 % -- --   08/22/23 1347 (!) 174/80 -- -- 82 -- 96 % -- --   08/22/23 1212 -- -- -- -- -- -- 1.575 m (5' 2\") 86.6 kg (191 lb)   08/22/23 1204 (!) 179/102 98.1  F (36.7  C) Oral 84 20 97 % -- --       PHYSICAL EXAM:   Physical Exam    GENERAL: Awake, alert.  In no acute distress.   HEENT: Normocephalic, atraumatic. Pupils equal, round and reactive. Conjunctiva normal. EOMI without nystagmus.  Normal posterior oropharynx.  Tongue is midline.  NECK: No stridor.  PULMONARY: Symmetrical breath sounds without distress.  Lungs clear to auscultation bilaterally without wheezes, rhonchi or rales.  CARDIO: Regular rate and rhythm.  No significant murmur, rub or gallop.  Radial pulses strong and " symmetrical.  ABDOMINAL: Abdomen soft, non-distended and non-tender to palpation.    EXTREMITIES: No lower extremity swelling or edema.      NEURO: Alert and oriented to person, place and time.  Cranial nerves III-XII intact.  Strength 5/5 BL upper and lower extremities with sensation to light touch grossly intact.   PSYCH: Normal mood and affect.      Results     LAB:  All pertinent labs reviewed and interpreted  Labs Ordered and Resulted from Time of ED Arrival to Time of ED Departure   BASIC METABOLIC PANEL - Abnormal       Result Value    Sodium 136      Potassium 4.0      Chloride 100      Carbon Dioxide (CO2) 25      Anion Gap 11      Urea Nitrogen 11.3      Creatinine 0.49 (*)     Calcium 9.5      Glucose 101 (*)     GFR Estimate >90     ROUTINE UA WITH MICROSCOPIC REFLEX TO CULTURE - Abnormal    Color Urine Colorless      Appearance Urine Clear      Glucose Urine Negative      Bilirubin Urine Negative      Ketones Urine Negative      Specific Gravity Urine 1.006      Blood Urine Negative      pH Urine 5.5      Protein Albumin Urine Negative      Urobilinogen Urine <2.0      Nitrite Urine Negative      Leukocyte Esterase Urine Negative      RBC Urine <1      WBC Urine 1      Squamous Epithelials Urine 3 (*)    CBC WITH PLATELETS - Normal    WBC Count 6.6      RBC Count 4.44      Hemoglobin 13.6      Hematocrit 40.5      MCV 91      MCH 30.6      MCHC 33.6      RDW 13.0      Platelet Count 259     MAGNESIUM - Normal    Magnesium 1.8     TSH WITH FREE T4 REFLEX - Normal    TSH 2.55         RADIOLOGY:  Head CT w/o contrast   Final Result   IMPRESSION:   1.  No finding for intracranial hemorrhage or mass.      2.  Moderate volume loss. Patchy areas of low-attenuation change are seen within the cerebral white matter, nonspecific but compatible with areas of gliosis or chronic myelin loss and likely reflecting sequela of chronic microvascular ischemic change. In    the absence of priors for comparison, areas of  recent on chronic ischemic change cannot be excluded.                         EC2023, 12:21; NSR with rate of 75 bpm; normal intervals; normal conduction; no ST-T wave changes consistent with ACS or pericarditis; no previous EKG available for comparison    EKG independently reviewed and interpreted by Lyudmila Smith MD        I, Maricel Flores, am serving as a scribe to document services personally performed by Lyudmila Smith MD based on my observation and the provider's statements to me. I, Lyudmila Smith MD attest that Maricel Flores is acting in a scribe capacity, has observed my performance of the services and has documented them in accordance with my direction.    Lyudmila Smith MD  Emergency Medicine  [facility] EMERGENCY DEPARTMENT           Lyudmila Smith MD  [phone]

## 2023-08-22 NOTE — DISCHARGE INSTRUCTIONS
Please follow-up with your Primary Care Provider within 3 days; call to arrange appointment.      Return to the ER for worsening symptoms, sudden onset or severe headache, focal neurologic deficit (for example, facial droop or right arm weakness), chest pain, shortness of breath, persistent nausea / vomiting, fever or other concerns.

## 2023-09-10 LAB
ATRIAL RATE - MUSE: 75 BPM
DIASTOLIC BLOOD PRESSURE - MUSE: NORMAL MMHG
INTERPRETATION ECG - MUSE: NORMAL
P AXIS - MUSE: 58 DEGREES
PR INTERVAL - MUSE: 158 MS
QRS DURATION - MUSE: 88 MS
QT - MUSE: 376 MS
QTC - MUSE: 419 MS
R AXIS - MUSE: 36 DEGREES
SYSTOLIC BLOOD PRESSURE - MUSE: NORMAL MMHG
T AXIS - MUSE: 50 DEGREES
VENTRICULAR RATE- MUSE: 75 BPM